# Patient Record
Sex: FEMALE | Race: BLACK OR AFRICAN AMERICAN | NOT HISPANIC OR LATINO | Employment: FULL TIME | URBAN - METROPOLITAN AREA
[De-identification: names, ages, dates, MRNs, and addresses within clinical notes are randomized per-mention and may not be internally consistent; named-entity substitution may affect disease eponyms.]

---

## 2018-03-12 PROBLEM — Z83.3 FAMILY HISTORY OF DIABETES MELLITUS (DM): Chronic | Status: ACTIVE | Noted: 2018-03-12

## 2018-03-12 PROBLEM — Z83.49 FAMILY HISTORY OF THYROID DISEASE: Chronic | Status: ACTIVE | Noted: 2018-03-12

## 2018-03-12 PROBLEM — E05.00 GRAVES' DISEASE: Chronic | Status: ACTIVE | Noted: 2018-03-12

## 2018-03-29 PROBLEM — H53.8 BLURRY VISION, RIGHT EYE: Status: ACTIVE | Noted: 2018-03-29

## 2018-04-27 ENCOUNTER — INITIAL CONSULT (OUTPATIENT)
Dept: OPHTHALMOLOGY | Facility: CLINIC | Age: 38
End: 2018-04-27
Payer: MEDICAID

## 2018-04-27 DIAGNOSIS — H18.601 KERATOCONUS OF RIGHT EYE: ICD-10-CM

## 2018-04-27 PROCEDURE — 92004 COMPRE OPH EXAM NEW PT 1/>: CPT | Mod: S$PBB,,, | Performed by: OPHTHALMOLOGY

## 2018-04-27 PROCEDURE — 99999 PR PBB SHADOW E&M-EST. PATIENT-LVL II: CPT | Mod: PBBFAC,,, | Performed by: OPHTHALMOLOGY

## 2018-04-27 PROCEDURE — 99212 OFFICE O/P EST SF 10 MIN: CPT | Mod: PBBFAC | Performed by: OPHTHALMOLOGY

## 2018-04-27 NOTE — LETTER
April 27, 2018      Popeye Riggins MD  [address]  [place] [address]           [facility]  [address]  [place] [address]  Phone: [phone]  Fax: [phone]          Patient: Heaven Hernandez   MR Number: [id number]   YOB: 1980   Date of Visit: 4/27/2018       Dear Dr. Popeye Riggins:    Thank you for referring Heaven Hernandez to me for evaluation. Attached you will find relevant portions of my assessment and plan of care.    If you have questions, please do not hesitate to call me. I look forward to following Heaven Hernandez along with you.    Sincerely,    Gilbert Joel MD    Enclosure  CC:  No Recipients    If you would like to receive this communication electronically, please contact [email or web address] or [phone] to request more information on [employer] access.    For providers and/or their staff who would like to refer a patient to [facility], please contact us through our one-stop-shop provider referral line, [facility], at [phone].    If you feel you have received this communication in error or would no longer like to receive these types of communications, please e-mail [email or web address]

## 2018-04-27 NOTE — PROGRESS NOTES
HPI     Referred by Dr.Austin Riggins  Dx w/Graves disease since 10/2017. Pt states OD vision blurry(hazy) since   11/2017.  Occasional headache, no eye pain.  No eye drops.    I have personally interviewed the patient, reviewed the history and   examined the patient and agree with the technician's exam.    Last edited by Gilbert Joel MD on 4/27/2018  2:52 PM. (History)            Assessment /Plan     For exam results, see Encounter Report.    Keratoconus of right eye      Ms. Hernandez has acquired high cylinder consistent with keratoconus. I found no evidence of a neurological process. She might be able to see much better with a contact lens. She is scheduled to see Dr. Riggins in three days and can be further evaluated then. She will return to me as requested.

## 2020-10-22 ENCOUNTER — HISTORICAL (OUTPATIENT)
Dept: INTERNAL MEDICINE | Facility: CLINIC | Age: 40
End: 2020-10-22

## 2020-10-22 LAB
T4 FREE SERPL-MCNC: 2.4 NG/DL (ref 0.7–1.48)
TSH SERPL-ACNC: <0.0083 UIU/ML (ref 0.35–4.94)

## 2020-12-03 ENCOUNTER — HISTORICAL (OUTPATIENT)
Dept: INTERNAL MEDICINE | Facility: CLINIC | Age: 40
End: 2020-12-03

## 2020-12-03 LAB
T4 FREE SERPL-MCNC: 1.3 NG/DL (ref 0.7–1.48)
TSH SERPL-ACNC: <0.0083 UIU/ML (ref 0.35–4.94)

## 2021-01-19 ENCOUNTER — HISTORICAL (OUTPATIENT)
Dept: ADMINISTRATIVE | Facility: HOSPITAL | Age: 41
End: 2021-01-19

## 2021-01-19 LAB
T4 FREE SERPL-MCNC: 1.44 NG/DL (ref 0.7–1.48)
TSH SERPL-ACNC: <0.0083 UIU/ML (ref 0.35–4.94)

## 2022-04-10 ENCOUNTER — HISTORICAL (OUTPATIENT)
Dept: ADMINISTRATIVE | Facility: HOSPITAL | Age: 42
End: 2022-04-10

## 2022-04-24 VITALS
SYSTOLIC BLOOD PRESSURE: 168 MMHG | DIASTOLIC BLOOD PRESSURE: 90 MMHG | WEIGHT: 188.06 LBS | BODY MASS INDEX: 32.11 KG/M2 | HEIGHT: 64 IN